# Patient Record
Sex: MALE | Race: BLACK OR AFRICAN AMERICAN | NOT HISPANIC OR LATINO | Employment: PART TIME | URBAN - METROPOLITAN AREA
[De-identification: names, ages, dates, MRNs, and addresses within clinical notes are randomized per-mention and may not be internally consistent; named-entity substitution may affect disease eponyms.]

---

## 2017-05-30 ENCOUNTER — HOSPITAL ENCOUNTER (EMERGENCY)
Facility: HOSPITAL | Age: 32
Discharge: HOME | End: 2017-05-30
Attending: EMERGENCY MEDICINE

## 2017-05-30 VITALS
HEART RATE: 109 BPM | BODY MASS INDEX: 23.04 KG/M2 | SYSTOLIC BLOOD PRESSURE: 165 MMHG | RESPIRATION RATE: 18 BRPM | HEIGHT: 68 IN | WEIGHT: 152 LBS | DIASTOLIC BLOOD PRESSURE: 94 MMHG | OXYGEN SATURATION: 98 % | TEMPERATURE: 98 F

## 2017-05-30 DIAGNOSIS — S62.609A FINGER FRACTURE, LEFT, CLOSED, INITIAL ENCOUNTER: Primary | ICD-10-CM

## 2017-05-30 DIAGNOSIS — S61.219A FINGER LACERATION, INITIAL ENCOUNTER: ICD-10-CM

## 2017-05-30 DIAGNOSIS — T07.XXXA ABRASIONS OF MULTIPLE SITES: ICD-10-CM

## 2017-05-30 PROCEDURE — 12001 RPR S/N/AX/GEN/TRNK 2.5CM/<: CPT

## 2017-05-30 PROCEDURE — 26720 TREAT FINGER FRACTURE EACH: CPT | Mod: F2

## 2017-05-30 PROCEDURE — 25000003 PHARM REV CODE 250: Performed by: PHYSICIAN ASSISTANT

## 2017-05-30 PROCEDURE — 29130 APPL FINGER SPLINT STATIC: CPT

## 2017-05-30 PROCEDURE — 90471 IMMUNIZATION ADMIN: CPT | Performed by: PHYSICIAN ASSISTANT

## 2017-05-30 PROCEDURE — 99283 EMERGENCY DEPT VISIT LOW MDM: CPT

## 2017-05-30 PROCEDURE — 63600175 PHARM REV CODE 636 W HCPCS: Performed by: PHYSICIAN ASSISTANT

## 2017-05-30 PROCEDURE — 90715 TDAP VACCINE 7 YRS/> IM: CPT | Performed by: PHYSICIAN ASSISTANT

## 2017-05-30 RX ORDER — IBUPROFEN 600 MG/1
600 TABLET ORAL EVERY 6 HOURS PRN
Qty: 30 TABLET | Refills: 0 | Status: SHIPPED | OUTPATIENT
Start: 2017-05-30

## 2017-05-30 RX ORDER — CLINDAMYCIN HYDROCHLORIDE 150 MG/1
300 CAPSULE ORAL 4 TIMES DAILY
Qty: 56 CAPSULE | Refills: 0 | Status: SHIPPED | OUTPATIENT
Start: 2017-05-30 | End: 2017-06-06

## 2017-05-30 RX ORDER — ALPRAZOLAM 0.5 MG/1
0.5 TABLET ORAL 3 TIMES DAILY
COMMUNITY

## 2017-05-30 RX ORDER — LIDOCAINE HYDROCHLORIDE 10 MG/ML
10 INJECTION INFILTRATION; PERINEURAL
Status: COMPLETED | OUTPATIENT
Start: 2017-05-30 | End: 2017-05-30

## 2017-05-30 RX ORDER — CLINDAMYCIN HYDROCHLORIDE 150 MG/1
300 CAPSULE ORAL EVERY 6 HOURS
Status: DISCONTINUED | OUTPATIENT
Start: 2017-05-30 | End: 2017-05-30

## 2017-05-30 RX ORDER — CLINDAMYCIN HYDROCHLORIDE 150 MG/1
300 CAPSULE ORAL ONCE
Status: COMPLETED | OUTPATIENT
Start: 2017-05-30 | End: 2017-05-30

## 2017-05-30 RX ORDER — OXYCODONE AND ACETAMINOPHEN 5; 325 MG/1; MG/1
1 TABLET ORAL
Status: COMPLETED | OUTPATIENT
Start: 2017-05-30 | End: 2017-05-30

## 2017-05-30 RX ADMIN — LIDOCAINE HYDROCHLORIDE 10 ML: 10 INJECTION, SOLUTION INFILTRATION; PERINEURAL at 09:05

## 2017-05-30 RX ADMIN — OXYCODONE HYDROCHLORIDE AND ACETAMINOPHEN 1 TABLET: 5; 325 TABLET ORAL at 10:05

## 2017-05-30 RX ADMIN — BACITRACIN, NEOMYCIN, POLYMYXIN B 1 EACH: 400; 3.5; 5 OINTMENT TOPICAL at 09:05

## 2017-05-30 RX ADMIN — CLINDAMYCIN HYDROCHLORIDE 300 MG: 150 CAPSULE ORAL at 10:05

## 2017-05-30 RX ADMIN — CLOSTRIDIUM TETANI TOXOID ANTIGEN (FORMALDEHYDE INACTIVATED), CORYNEBACTERIUM DIPHTHERIAE TOXOID ANTIGEN (FORMALDEHYDE INACTIVATED), BORDETELLA PERTUSSIS TOXOID ANTIGEN (GLUTARALDEHYDE INACTIVATED), BORDETELLA PERTUSSIS FILAMENTOUS HEMAGGLUTININ ANTIGEN (FORMALDEHYDE INACTIVATED), BORDETELLA PERTUSSIS PERTACTIN ANTIGEN, AND BORDETELLA PERTUSSIS FIMBRIAE 2/3 ANTIGEN 0.5 ML: 5; 2; 2.5; 5; 3; 5 INJECTION, SUSPENSION INTRAMUSCULAR at 09:05

## 2017-05-30 NOTE — ED TRIAGE NOTES
Pt reports he slammed his left hand in the front house door this morning at approximately 1 hr PTA. Pt with swelling and  LAC noted to left index finger, skin tear noted to left middle finger. N/V intact. No active bleeding.

## 2017-05-30 NOTE — DISCHARGE INSTRUCTIONS
Suture to be removed in 10-14 days.  Return to this ED for suture removal.  Return to this ED if wound becomes red, swollen, begins to drain, or you begin with fever.  Present to orthopedic physician for reevaluation of fracture and further care.  Return to this ED if any problems occur.

## 2017-05-30 NOTE — ED PROVIDER NOTES
Encounter Date: 5/30/2017       History     Chief Complaint   Patient presents with    Finger Injury     pt reports accidentally slamming 1st two digits in the car door 30 mins ago.     Review of patient's allergies indicates:  No Known Allergies  31-year-old male with no significant past medical history on file presents the ED with chief complaint left hand/finger pain after slamming his fingers in car door prior to arrival.  Patient admits to open wound, with bleeding controlled.  Patient does admit to numbness to distal second and third fingers, without radiculopathy.  He admits to decreased flexion of fingers secondary to pain, full extension.  No radiation of symptoms.  Symptoms are constant.  No wrist pain, no forearm pain, no elbow pain or shoulder pain.  Symptoms are constant.  No alleviating factors.  Pain exacerbated with palpation or movement.        History reviewed. No pertinent past medical history.  History reviewed. No pertinent surgical history.  History reviewed. No pertinent family history.  Social History   Substance Use Topics    Smoking status: Current Every Day Smoker     Types: Cigarettes    Smokeless tobacco: Not on file    Alcohol use Yes      Comment: occasional     Review of Systems   Constitutional: Negative for activity change, appetite change, chills and fever.   HENT: Negative.    Eyes: Negative for pain and visual disturbance.   Respiratory: Negative for apnea, cough, chest tightness, shortness of breath and wheezing.    Cardiovascular: Negative for chest pain.   Gastrointestinal: Negative for nausea and vomiting.   Endocrine: Negative.    Genitourinary: Negative.    Musculoskeletal: Positive for arthralgias and joint swelling. Negative for back pain, gait problem, myalgias, neck pain and neck stiffness.   Skin: Positive for wound. Negative for color change, pallor and rash.   Allergic/Immunologic: Negative.    Neurological: Negative for dizziness, syncope, weakness and  light-headedness.   Hematological: Negative.    Psychiatric/Behavioral: Negative.    All other systems reviewed and are negative.      Physical Exam     Initial Vitals [05/30/17 0822]   BP Pulse Resp Temp SpO2   (!) 165/94 109 18 98.2 °F (36.8 °C) 98 %     Physical Exam    Nursing note and vitals reviewed.  Constitutional: He appears well-developed and well-nourished. He is not diaphoretic. He is cooperative.  Non-toxic appearance. He does not have a sickly appearance. He does not appear ill. No distress.   HENT:   Head: Normocephalic and atraumatic.   Eyes: Conjunctivae and EOM are normal. Pupils are equal, round, and reactive to light.   Neck: Normal range of motion. Neck supple. No tracheal deviation present.   Cardiovascular: Normal heart sounds and intact distal pulses.   No murmur heard.  Pulmonary/Chest: Breath sounds normal. No stridor. No respiratory distress. He has no wheezes. He has no rhonchi. He exhibits no tenderness.   Abdominal: Soft. Bowel sounds are normal. He exhibits no distension and no mass. There is no tenderness. There is no guarding.   Musculoskeletal: He exhibits tenderness.        Left hand: He exhibits decreased range of motion, tenderness and bony tenderness. He exhibits normal capillary refill and no deformity. Normal sensation noted. Normal strength noted.        Hands:  TTP middle phalanx of left second and third digits.  No PIP or DIP pain or tenderness.  No MCP pain or tenderness to palpation.  No sick flexion of second third finger secondary to swelling and pain.  Full extension.  Small quarter centimeter laceration to lateral second middle phalanx.   Lymphadenopathy:     He has no cervical adenopathy.   Neurological: He is alert and oriented to person, place, and time. He has normal strength.   Skin: Skin is warm and dry. Capillary refill takes 2 to 3 seconds. No rash and no abscess noted. No erythema.   Psychiatric: He has a normal mood and affect. His behavior is normal.  Judgment and thought content normal.         ED Course   Lac Repair  Date/Time: 5/30/2017 9:45 AM  Performed by: JIM QUINTANILLA  Authorized by: KYM TRIPP   Body area: upper extremity  Location details: left index finger  Laceration length: 0.3 cm  Foreign bodies: no foreign bodies  Tendon involvement: none  Nerve involvement: none  Vascular damage: no  Anesthesia: local infiltration    Anesthesia:  Anesthesia: local infiltration  Local Anesthetic: lidocaine 1% without epinephrine   Anesthetic total: 3 mL  Patient sedated: no  Preparation: Patient was prepped and draped in the usual sterile fashion.  Irrigation solution: saline  Irrigation method: syringe  Amount of cleaning: standard  Debridement: minimal  Degree of undermining: none  Skin closure: 4-0 nylon  Number of sutures: 1  Technique: simple  Approximation: close  Approximation difficulty: simple  Dressing: antibiotic ointment and 4x4 sterile gauze  Patient tolerance: Patient tolerated the procedure well with no immediate complications        Labs Reviewed - No data to display          Medical Decision Making:   Initial Assessment:   31-year-old male chief complaint finger pain and laceration after slamming finger in car door prior to arrival.  Differential Diagnosis:   Infected wound, finger fracture, abscess, abrasion, hematoma, contusion  Clinical Tests:   Radiological Study: Ordered and Reviewed  ED Management:  Patient overall well-appearing, in no acute distress, afebrile, vitals within normal limits.  Patient's chief complaint is left second and third finger pain after slamming fingers in car door prior to arrival.  He does have evidence of small laceration to 2nd digit, which I do think is amenable to repair.  Patient is right handed.     X-ray without evidence of foreign body.  Wound irrigated well.  X-ray does show evidence of third middle phalanx fracture, nondisplaced distal aspect of the bone.  Wounds irrigated and cleaned well.   Laceration closed with single suture, first dose of antibiotic given in the ED.  Splint applied to fractured finger.  I've given patient orthopedic follow-up information for reevaluation of finger.  He does understand and agree with treatment plan.  I will give him 7 days of clindamycin, in addition to ibuprofen for analgesia.  He does understand and agree with treatment plan.  He is to return to ED in 10-14 days for suture removal. I've asked him to return to ED with any signs of wound infection.  Other:   I have discussed this case with another health care provider.       <> Summary of the Discussion: I have discussed this case with .              Attending Attestation:     Physician Attestation Statement for NP/PA:   I discussed this assessment and plan of this patient with the NP/PA, but I did not personally examine the patient. The face to face encounter was performed by the NP/PA.    Other NP/PA Attestation Additions:      Medical Decision Making: Agree with assessment and management.                 ED Course     Clinical Impression:   The primary encounter diagnosis was Finger fracture, left, closed, initial encounter. Diagnoses of Finger laceration, initial encounter and Abrasions of multiple sites were also pertinent to this visit.    Disposition:   Disposition: Discharged  Condition: Stable       Waqas Graves PA-C  05/30/17 121       Masoud Camp MD  05/30/17 1234

## 2017-06-05 ENCOUNTER — TELEPHONE (OUTPATIENT)
Dept: ORTHOPEDICS | Facility: CLINIC | Age: 32
End: 2017-06-05

## 2017-06-05 NOTE — TELEPHONE ENCOUNTER
----- Message from Dania Luther sent at 6/5/2017  9:27 AM CDT -----  Contact: self   Pt stated that he missed a call from our number and wasn't sure if someone was trying to set up an appt. The patient went to the ED on 5/30 on the [facility] for his finger but wasn't sure if he was supposed to schedule an appt. The pt does not have insurance. I wasn't sure who tried to call him but I told the pt I will put in a message for him. [phone]

## 2017-06-05 NOTE — TELEPHONE ENCOUNTER
VM left, our office did not contact patient. He will need to call [facility] [phone] or [facility] [phone].